# Patient Record
Sex: MALE | Race: WHITE | Employment: UNEMPLOYED | URBAN - METROPOLITAN AREA
[De-identification: names, ages, dates, MRNs, and addresses within clinical notes are randomized per-mention and may not be internally consistent; named-entity substitution may affect disease eponyms.]

---

## 2018-12-25 ENCOUNTER — HOSPITAL ENCOUNTER (EMERGENCY)
Age: 23
Discharge: HOME | End: 2018-12-25
Attending: EMERGENCY MEDICINE

## 2018-12-25 VITALS
TEMPERATURE: 98 F | HEIGHT: 76 IN | OXYGEN SATURATION: 100 % | BODY MASS INDEX: 28.46 KG/M2 | WEIGHT: 233.69 LBS | DIASTOLIC BLOOD PRESSURE: 89 MMHG | SYSTOLIC BLOOD PRESSURE: 132 MMHG | RESPIRATION RATE: 18 BRPM | HEART RATE: 99 BPM

## 2018-12-25 DIAGNOSIS — T40.601A OPIATE OVERDOSE, ACCIDENTAL OR UNINTENTIONAL, INITIAL ENCOUNTER (HCC): Primary | ICD-10-CM

## 2018-12-25 PROCEDURE — 99284 EMERGENCY DEPT VISIT MOD MDM: CPT

## 2018-12-25 NOTE — ED PROVIDER NOTES
No acute deformities. SKIN: Warm and dry. NEUROLOGICAL: No gross facial drooping. Moves all 4 extremities spontaneously. PSYCHIATRIC: Normal mood. I have reviewed and interpreted all of the currently available lab results from this visit (if applicable):  No results found for this visit on 12/25/18. EKG: (All EKG's are interpreted by myself in the absence of a cardiologist)      MDM:  Patient awake and alert in no acute distress. I did observe him in the emergency department. He did not require any further Narcan. Patient was counseled on drug abuse and given information. Discharged home in good condition with friend. Clinical Impression:  1. Opiate overdose, accidental or unintentional, initial encounter ([facility] [address]        Disposition Vitals:  [unfilled], [unfilled], [unfilled], [unfilled]    Disposition referral (if applicable):  [facility]) Pre-Services  [phone]          Disposition medications (if applicable): There are no discharge medications for this patient.         (Please note that portions of this note may have been completed with a voice recognition program. Efforts were made to edit the dictations but occasionally words are mis-transcribed.)    MD Monty Fowler MD  12/25/18 [id number]

## 2018-12-25 NOTE — ED NOTES
Pt DC with friend. Pardeep Kline. Pt given PCP referral and advised to call the number circled on the front of dc paperwork asap.  Electronically signed by Rishi Bolden RN on 12/25/2018 at [address] [place] [address], RN  12/25/18 [id number]

## 2021-12-27 VITALS
OXYGEN SATURATION: 97 % | TEMPERATURE: 97 F | HEART RATE: 86 BPM | RESPIRATION RATE: 18 BRPM | DIASTOLIC BLOOD PRESSURE: 99 MMHG | SYSTOLIC BLOOD PRESSURE: 185 MMHG

## 2021-12-28 ENCOUNTER — HOSPITAL ENCOUNTER (EMERGENCY)
Age: 26
Discharge: LEFT BEFORE DISCHARGE | End: 2021-12-28